# Patient Record
Sex: MALE | Race: WHITE
[De-identification: names, ages, dates, MRNs, and addresses within clinical notes are randomized per-mention and may not be internally consistent; named-entity substitution may affect disease eponyms.]

---

## 2018-05-22 ENCOUNTER — HOSPITAL ENCOUNTER (EMERGENCY)
Dept: HOSPITAL 96 - M.ERS | Age: 23
Discharge: HOME | End: 2018-05-22
Payer: COMMERCIAL

## 2018-05-22 VITALS — SYSTOLIC BLOOD PRESSURE: 111 MMHG | DIASTOLIC BLOOD PRESSURE: 57 MMHG

## 2018-05-22 VITALS — BODY MASS INDEX: 33.86 KG/M2 | WEIGHT: 250 LBS | HEIGHT: 72 IN
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
## 2018-05-22 DIAGNOSIS — S61.412A: Primary | ICD-10-CM
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
## 2018-05-22 DIAGNOSIS — Z87.442: ICD-10-CM

## 2018-05-22 LAB
ABSOLUTE BASOPHILS: 0.2 THOU/UL (ref 0–0.2)
ABSOLUTE EOSINOPHILS: 0.1 THOU/UL (ref 0–0.7)
ABSOLUTE MONOCYTES: 1 THOU/UL (ref 0–1.2)
BASOPHILS NFR BLD AUTO: 1.3 %
EOSINOPHIL NFR BLD: 0.8 %
GRANULOCYTES NFR BLD MANUAL: 45.6 %
HCT VFR BLD CALC: 44.7 % (ref 42–52)
HGB BLD-MCNC: 15 GM/DL (ref 14–18)
LYMPHOCYTES # BLD: 5.4 THOU/UL (ref 0.8–5.3)
LYMPHOCYTES NFR BLD AUTO: 44.2 %
MCH RBC QN AUTO: 30.9 PG (ref 26–34)
MCHC RBC AUTO-ENTMCNC: 33.6 G/DL (ref 28–37)
MCV RBC: 92.1 FL (ref 80–100)
MONOCYTES NFR BLD: 8.1 %
MPV: 8.8 FL. (ref 7.2–11.1)
NEUTROPHILS # BLD: 5.6 THOU/UL (ref 1.6–8.1)
NUCLEATED RBCS: 0 /100WBC
PLATELET COUNT*: 330 THOU/UL (ref 150–400)
RBC # BLD AUTO: 4.86 MIL/UL (ref 4.5–6)
RDW-CV: 14.4 % (ref 10.5–14.5)
WBC # BLD AUTO: 12.3 THOU/UL (ref 4–11)

## 2018-05-23 ENCOUNTER — APPOINTMENT (RX ONLY)
Dept: URBAN - METROPOLITAN AREA CLINIC 22 | Facility: CLINIC | Age: 23
Setting detail: DERMATOLOGY
End: 2018-05-23

## 2018-05-23 DIAGNOSIS — H59229 ACCIDENTAL PUNCTURE OR LACERATION DURING A PROCEDURE, NOT ELSEWHERE CLASSIFIED: ICD-10-CM

## 2018-05-23 DIAGNOSIS — J9571 ACCIDENTAL PUNCTURE OR LACERATION DURING A PROCEDURE, NOT ELSEWHERE CLASSIFIED: ICD-10-CM

## 2018-05-23 DIAGNOSIS — D7812 ACCIDENTAL PUNCTURE OR LACERATION DURING A PROCEDURE, NOT ELSEWHERE CLASSIFIED: ICD-10-CM

## 2018-05-23 DIAGNOSIS — K9171 ACCIDENTAL PUNCTURE OR LACERATION DURING A PROCEDURE, NOT ELSEWHERE CLASSIFIED: ICD-10-CM

## 2018-05-23 DIAGNOSIS — K9172 ACCIDENTAL PUNCTURE OR LACERATION DURING A PROCEDURE, NOT ELSEWHERE CLASSIFIED: ICD-10-CM

## 2018-05-23 DIAGNOSIS — T888XXA ACCIDENTAL PUNCTURE OR LACERATION DURING A PROCEDURE, NOT ELSEWHERE CLASSIFIED: ICD-10-CM

## 2018-05-23 DIAGNOSIS — G9748 ACCIDENTAL PUNCTURE OR LACERATION DURING A PROCEDURE, NOT ELSEWHERE CLASSIFIED: ICD-10-CM

## 2018-05-23 DIAGNOSIS — J9572 ACCIDENTAL PUNCTURE OR LACERATION DURING A PROCEDURE, NOT ELSEWHERE CLASSIFIED: ICD-10-CM

## 2018-05-23 DIAGNOSIS — E3612 ACCIDENTAL PUNCTURE OR LACERATION DURING A PROCEDURE, NOT ELSEWHERE CLASSIFIED: ICD-10-CM

## 2018-05-23 DIAGNOSIS — H9532 ACCIDENTAL PUNCTURE OR LACERATION DURING A PROCEDURE, NOT ELSEWHERE CLASSIFIED: ICD-10-CM

## 2018-05-23 DIAGNOSIS — S64.49 INJURY OF DIGITAL NERVE OF OTHER FINGER: ICD-10-CM

## 2018-05-23 DIAGNOSIS — I9752 ACCIDENTAL PUNCTURE OR LACERATION DURING A PROCEDURE, NOT ELSEWHERE CLASSIFIED: ICD-10-CM

## 2018-05-23 DIAGNOSIS — D7811 ACCIDENTAL PUNCTURE OR LACERATION DURING A PROCEDURE, NOT ELSEWHERE CLASSIFIED: ICD-10-CM

## 2018-05-23 DIAGNOSIS — N9972 ACCIDENTAL PUNCTURE OR LACERATION DURING A PROCEDURE, NOT ELSEWHERE CLASSIFIED: ICD-10-CM

## 2018-05-23 DIAGNOSIS — M96821 ACCIDENTAL PUNCTURE OR LACERATION DURING A PROCEDURE, NOT ELSEWHERE CLASSIFIED: ICD-10-CM

## 2018-05-23 DIAGNOSIS — G9749 ACCIDENTAL PUNCTURE OR LACERATION DURING A PROCEDURE, NOT ELSEWHERE CLASSIFIED: ICD-10-CM

## 2018-05-23 DIAGNOSIS — I9751 ACCIDENTAL PUNCTURE OR LACERATION DURING A PROCEDURE, NOT ELSEWHERE CLASSIFIED: ICD-10-CM

## 2018-05-23 DIAGNOSIS — E3611 ACCIDENTAL PUNCTURE OR LACERATION DURING A PROCEDURE, NOT ELSEWHERE CLASSIFIED: ICD-10-CM

## 2018-05-23 DIAGNOSIS — L7612 ACCIDENTAL PUNCTURE OR LACERATION DURING A PROCEDURE, NOT ELSEWHERE CLASSIFIED: ICD-10-CM

## 2018-05-23 DIAGNOSIS — M96820 ACCIDENTAL PUNCTURE OR LACERATION DURING A PROCEDURE, NOT ELSEWHERE CLASSIFIED: ICD-10-CM

## 2018-05-23 DIAGNOSIS — L7611 ACCIDENTAL PUNCTURE OR LACERATION DURING A PROCEDURE, NOT ELSEWHERE CLASSIFIED: ICD-10-CM

## 2018-05-23 DIAGNOSIS — N9971 ACCIDENTAL PUNCTURE OR LACERATION DURING A PROCEDURE, NOT ELSEWHERE CLASSIFIED: ICD-10-CM

## 2018-05-23 DIAGNOSIS — H9531 ACCIDENTAL PUNCTURE OR LACERATION DURING A PROCEDURE, NOT ELSEWHERE CLASSIFIED: ICD-10-CM

## 2018-05-23 DIAGNOSIS — H59219 ACCIDENTAL PUNCTURE OR LACERATION DURING A PROCEDURE, NOT ELSEWHERE CLASSIFIED: ICD-10-CM

## 2018-05-23 DIAGNOSIS — S68.1 TRAUMATIC METACARPOPHALANGEAL AMPUTATION OF OTHER AND UNSPECIFIED FINGER: ICD-10-CM

## 2018-05-23 PROBLEM — S68.113A: Status: ACTIVE | Noted: 2018-05-23

## 2018-05-23 PROBLEM — S64.498A INJURY OF DIGITAL NERVE OF OTHER FINGER, INITIAL ENCOUNTER: Status: ACTIVE | Noted: 2018-05-23

## 2018-05-23 PROBLEM — I10 ESSENTIAL (PRIMARY) HYPERTENSION: Status: ACTIVE | Noted: 2018-05-23

## 2018-05-23 PROBLEM — S61.412A LACERATION WITHOUT FOREIGN BODY OF LEFT HAND, INITIAL ENCOUNTER: Status: ACTIVE | Noted: 2018-05-23

## 2018-05-23 PROCEDURE — ? COUNSELING - DIGITAL NERVE INJURY

## 2018-05-23 PROCEDURE — ? COUNSELING - LACERATION

## 2018-05-23 PROCEDURE — ? COUNSELING - AMPUTATIONS

## 2018-05-23 PROCEDURE — ? ORDER FOR SURGERY

## 2018-05-23 PROCEDURE — ? WORKER'S COMPENSATION EVALUATION

## 2018-05-23 PROCEDURE — ? PRESCRIPTION

## 2018-05-23 PROCEDURE — 99242 OFF/OP CONSLTJ NEW/EST SF 20: CPT

## 2018-05-23 PROCEDURE — ? PHYSICAL THERAPY RX

## 2018-05-23 RX ORDER — OXYCODONE HYDROCHLORIDE AND ACETAMINOPHEN 5; 325 MG/1; MG/1
TABLET ORAL
Qty: 40 | Refills: 0 | COMMUNITY
Start: 2018-05-23

## 2018-05-23 RX ADMIN — OXYCODONE HYDROCHLORIDE AND ACETAMINOPHEN: 5; 325 TABLET ORAL at 19:55

## 2018-05-23 ASSESSMENT — AREA OF WOUND IN CM2: TOTAL AREA OF WOUND IN CM2: 1

## 2018-05-23 ASSESSMENT — LOCATION ZONE DERM
LOCATION ZONE: FINGER
LOCATION ZONE: HAND

## 2018-05-23 ASSESSMENT — LOCATION SIMPLE DESCRIPTION DERM
LOCATION SIMPLE: LEFT HAND
LOCATION SIMPLE: LEFT MIDDLE FINGER

## 2018-05-23 ASSESSMENT — LOCATION DETAILED DESCRIPTION DERM
LOCATION DETAILED: LEFT RADIAL PALM
LOCATION DETAILED: LEFT MIDDLE FINGERTIP

## 2018-05-23 ASSESSMENT — PAIN INTENSITY VAS: HOW INTENSE IS YOUR PAIN 0 BEING NO PAIN, 10 BEING THE MOST SEVERE PAIN POSSIBLE?: 4/10 PAIN

## 2018-05-23 NOTE — PROCEDURE: PHYSICAL THERAPY RX
Detail Level: Simple
Frequency (Ie: 2-3 Times Per Per Week): 1
Additional Treatment Instructions: orthoplaste and velcro anticipate Barr protocol wrist flexed, palmar adduction MP PIP DIP flexed
Interval: day
Specify Protocol Frequency: no

## 2018-05-23 NOTE — PROCEDURE: ORDER FOR SURGERY
Admission Status: outpatient
Cosmetic, Major Or Minor?: Major (Insurance, 90-day global)
Surgery To Be Ordered: exploration and repair digital nerve x6, repair of flexor tendon index finger zone 4 x2
Estimated Length Of Surgery: 1.5 hour
Detail Level: Simple
Facility: [facility]
Surgeon: Dr. Hernandez
Date Of Surgery: TBA

## 2018-05-23 NOTE — PROCEDURE: WORKER'S COMPENSATION EVALUATION
Detail Level: Simple
Other Restrictions: no working until further notice patient needs surgery
Objective And Relevant Medical Findings?: Yes
Injury Is: work related
Percentage Of Disability Unrelated To Employment: 0
Pre-Existing Medical Condition Contributing To Current Injury?: No

## 2018-05-23 NOTE — HPI: FINGERTIP INJURY
Is The Injury New Or Recurrent?: new
How Severe Is It?: moderate
How Is Your Wound Healing?: fresh
Is This A New Presentation, Or A Follow-Up?: Fingertip Injury
Date Of Injury: 5/22/2018

## 2018-05-29 ENCOUNTER — APPOINTMENT (RX ONLY)
Dept: URBAN - METROPOLITAN AREA SURGERY CENTER 4 | Facility: SURGERY CENTER | Age: 23
Setting detail: DERMATOLOGY
End: 2018-05-29

## 2019-05-07 NOTE — HPI: LACERATION
Preventive Health Recommendations  Male Ages 50 - 64    Yearly exam:             See your health care provider every year in order to  o   Review health changes.   o   Discuss preventive care.    o   Review your medicines if your doctor has prescribed any.     Have a cholesterol test every 5 years, or more frequently if you are at risk for high cholesterol/heart disease.     Have a diabetes test (fasting glucose) every three years. If you are at risk for diabetes, you should have this test more often.     Have a colonoscopy at age 50, or have a yearly FIT test (stool test). These exams will check for colon cancer.      Talk with your health care provider about whether or not a prostate cancer screening test (PSA) is right for you.    You should be tested each year for STDs (sexually transmitted diseases), if you re at risk.     Shots: Get a flu shot each year. Get a tetanus shot every 10 years.     Nutrition:    Eat at least 5 servings of fruits and vegetables daily.     Eat whole-grain bread, whole-wheat pasta and brown rice instead of white grains and rice.     Get adequate Calcium and Vitamin D.     Lifestyle    Exercise for at least 150 minutes a week (30 minutes a day, 5 days a week). This will help you control your weight and prevent disease.     Limit alcohol to one drink per day.     No smoking.     Wear sunscreen to prevent skin cancer.     See your dentist every six months for an exam and cleaning.     See your eye doctor every 1 to 2 years.        Start Lexapro today. Break in half for the first couple days. Take with food. Use ibuprofen for mild frontal headache.    Use alprazolam sparingly for breakthrough anxiety.    Start amitriptyline tonight (25 mg). Can slowly increase up to  mg over the next couple weeks. Let me know if this doesn't help your sleep.         
How Severe Is It?: moderate
How Is Your Laceration Healing?: fresh
Is This A New Presentation, Or A Follow-Up?: Laceration
Where Was It Repaired?: [facility]
Date Of Injury: 5/22/2018

## 2019-05-08 ENCOUNTER — HOSPITAL ENCOUNTER (EMERGENCY)
Dept: HOSPITAL 96 - M.ERS | Age: 24
Discharge: LEFT BEFORE DISCHARGE | End: 2019-05-08
Payer: COMMERCIAL

## 2019-05-08 VITALS — BODY MASS INDEX: 36.57 KG/M2 | WEIGHT: 270 LBS | HEIGHT: 72 IN

## 2019-05-08 VITALS — DIASTOLIC BLOOD PRESSURE: 88 MMHG | SYSTOLIC BLOOD PRESSURE: 145 MMHG

## 2019-05-08 DIAGNOSIS — R00.2: Primary | ICD-10-CM

## 2019-05-08 DIAGNOSIS — R42: ICD-10-CM
